# Patient Record
Sex: MALE | Race: WHITE | NOT HISPANIC OR LATINO | URBAN - METROPOLITAN AREA
[De-identification: names, ages, dates, MRNs, and addresses within clinical notes are randomized per-mention and may not be internally consistent; named-entity substitution may affect disease eponyms.]

---

## 2017-04-24 ENCOUNTER — COMMUNICATION - RIVER FALLS (OUTPATIENT)
Dept: FAMILY MEDICINE | Facility: CLINIC | Age: 42
End: 2017-04-24

## 2017-04-24 ENCOUNTER — OFFICE VISIT - RIVER FALLS (OUTPATIENT)
Dept: FAMILY MEDICINE | Facility: CLINIC | Age: 42
End: 2017-04-24

## 2017-04-24 ASSESSMENT — MIFFLIN-ST. JEOR: SCORE: 1939.38

## 2017-04-25 LAB
CHOLEST SERPL-MCNC: 249 MG/DL (ref 125–200)
CHOLEST/HDLC SERPL: 6.1 {RATIO}
CREAT SERPL-MCNC: 1.26 MG/DL (ref 0.6–1.35)
GLUCOSE BLD-MCNC: 79 MG/DL (ref 65–99)
HDLC SERPL-MCNC: 41 MG/DL
LDLC SERPL CALC-MCNC: 157 MG/DL
NONHDLC SERPL-MCNC: 208 MG/DL
TRIGL SERPL-MCNC: 255 MG/DL

## 2017-11-13 ENCOUNTER — OFFICE VISIT - RIVER FALLS (OUTPATIENT)
Dept: FAMILY MEDICINE | Facility: CLINIC | Age: 42
End: 2017-11-13

## 2017-11-13 ASSESSMENT — MIFFLIN-ST. JEOR: SCORE: 1934.84

## 2018-06-05 ENCOUNTER — OFFICE VISIT - RIVER FALLS (OUTPATIENT)
Dept: FAMILY MEDICINE | Facility: CLINIC | Age: 43
End: 2018-06-05

## 2018-06-05 ASSESSMENT — MIFFLIN-ST. JEOR: SCORE: 1954.8

## 2019-06-11 ENCOUNTER — COMMUNICATION - RIVER FALLS (OUTPATIENT)
Dept: FAMILY MEDICINE | Facility: CLINIC | Age: 44
End: 2019-06-11

## 2019-06-12 ENCOUNTER — OFFICE VISIT - RIVER FALLS (OUTPATIENT)
Dept: FAMILY MEDICINE | Facility: CLINIC | Age: 44
End: 2019-06-12

## 2019-06-12 ASSESSMENT — MIFFLIN-ST. JEOR: SCORE: 1971.13

## 2021-11-04 ENCOUNTER — OFFICE VISIT - RIVER FALLS (OUTPATIENT)
Dept: FAMILY MEDICINE | Facility: CLINIC | Age: 46
End: 2021-11-04

## 2021-11-04 ENCOUNTER — COMMUNICATION - RIVER FALLS (OUTPATIENT)
Dept: FAMILY MEDICINE | Facility: CLINIC | Age: 46
End: 2021-11-04

## 2021-11-05 ENCOUNTER — COMMUNICATION - RIVER FALLS (OUTPATIENT)
Dept: FAMILY MEDICINE | Facility: CLINIC | Age: 46
End: 2021-11-05

## 2022-02-12 VITALS
SYSTOLIC BLOOD PRESSURE: 124 MMHG | DIASTOLIC BLOOD PRESSURE: 82 MMHG | WEIGHT: 232.4 LBS | BODY MASS INDEX: 33.27 KG/M2 | HEIGHT: 70 IN | HEART RATE: 112 BPM

## 2022-02-12 VITALS
TEMPERATURE: 98 F | DIASTOLIC BLOOD PRESSURE: 78 MMHG | WEIGHT: 235.8 LBS | HEART RATE: 102 BPM | SYSTOLIC BLOOD PRESSURE: 126 MMHG | HEIGHT: 70 IN | BODY MASS INDEX: 33.76 KG/M2

## 2022-02-12 VITALS
SYSTOLIC BLOOD PRESSURE: 130 MMHG | OXYGEN SATURATION: 97 % | HEART RATE: 86 BPM | DIASTOLIC BLOOD PRESSURE: 78 MMHG | HEIGHT: 70 IN | BODY MASS INDEX: 34.27 KG/M2 | WEIGHT: 239.4 LBS

## 2022-02-12 VITALS
WEIGHT: 231.4 LBS | BODY MASS INDEX: 33.13 KG/M2 | SYSTOLIC BLOOD PRESSURE: 104 MMHG | OXYGEN SATURATION: 98 % | HEART RATE: 108 BPM | HEIGHT: 70 IN | DIASTOLIC BLOOD PRESSURE: 76 MMHG

## 2022-02-16 NOTE — LETTER
(Inserted Image. Unable to display)         July 21, 2020      PANCHO SANTAMARIA   [place], [place] [id number]        Dear PANCHO,    You are due for medication check with Dr Gibson.  Video/Telephone visits along with in person visits at the [facility] are available at this time.   Dr Gibson will refill your medication at that appointment.      Make sure that you schedule your appointment before you run out of medication to ensure you're not going without any medication.  No further refills will be given until you have an appointment.    You may call  [phone] to schedule your  visit with Dr Gibson

## 2022-02-16 NOTE — PROGRESS NOTES
Patient:   PANCHO SANTAMARIA            MRN: [id number]            FIN: [id number]               Age:   43 years     Sex:  Male     :  1975   Associated Diagnoses:   JOHN (generalized anxiety disorder)   Author:   Jimmy Gibson MD      Impression and Plan   Diagnosis     JOHN (generalized anxiety disorder) ([id number] F41.1).     Course:  Progressing as expected.    Orders     Orders   Charges (Evaluation and Management):  60457 office outpatient visit 15 minutes (Charge) (Order): Quantity: 1, Depression, major, single episode, complete remission  JOHN (generalized anxiety disorder).     Orders (Selected)   Prescriptions  Prescribed  sertraline 100 mg oral tablet: = 1 tab(s) ( 100 mg ), PO, Daily, # 90 tab(s), 3 Refill(s), Type: Maintenance, Pharmacy: [id number], 1 tab(s) Oral daily.        Visit Information      Date of Service: 2019 03:28 pm  Performing Location: [facility]  Encounter#: [id number]      Primary Care Provider (PCP):  ASHLYN -UNKNOWN,    NPI# [id number]   Visit type:  Scheduled follow-up.    Accompanied by:  No one.    Source of history:  Self.    Referral source:  Self.    History limitation:  None.       Chief Complaint   2019 3:31 PM CDT    Pt here for med ck        History of Present Illness             The patient presents with anxiety.  The anxiety is characterized by difficulty concentrating, irritability, nervousness and restlessness.  The severity of the anxiety is moderate.  The anxiety is constant.  The context of the anxiety: occurred in association with the inability to cope with stress.  Exacerbating factors consist of emotional stress and large crowds of people.  Relieving factors consist of medication and stress reduction.        Review of Systems   Constitutional:  Negative.    Eye:  Negative.    Ear/Nose/Mouth/Throat:  Negative.    Respiratory:  Negative.    Cardiovascular:  Negative.    Gastrointestinal:  Negative.    Genitourinary:  Negative.     Hematology/Lymphatics:  Negative.    Endocrine:  Negative.    Immunologic:  Negative.    Musculoskeletal:  Negative.    Integumentary:  Negative.    Neurologic:  Negative.    Psychiatric:  Anxiety.    All other systems reviewed and negative      Health Status   Allergies:    Allergic Reactions (Selected)  No Known Medication Allergies   Medications:  (Selected)   Prescriptions  Prescribed  sertraline 100 mg oral tablet: = 1 tab(s) ( 100 mg ), PO, Daily, # 90 tab(s), 3 Refill(s), Type: Maintenance, Pharmacy: [id number], 1 tab(s) Oral daily   Problem list:    All Problems  Depression, major, single episode, complete remission / SNOMED CT 65325553 / Confirmed  JOHN (generalized anxiety disorder) / SNOMED CT 26249022 / Confirmed  Kidney stone / SNOMED CT 122683958 / Confirmed  Obesity / SNOMED CT 7770890323 / Probable      Histories   Past Medical History:    No active or resolved past medical history items have been selected or recorded.   Family History:       Procedure history:    No active procedure history items have been selected or recorded.      Physical Examination   Vital Signs   6/12/2019 3:31 PM CDT Peripheral Pulse Rate 86 bpm    Systolic Blood Pressure 130 mmHg    Diastolic Blood Pressure 78 mmHg    Mean Arterial Pressure 95 mmHg    Oxygen Saturation 97 %      Measurements from flowsheet : Measurements   6/12/2019 3:31 PM CDT Height Measured - Standard 70.25 in    Weight Measured - Standard 239.4 lb    BSA 2.32 m2    Body Mass Index 34.1 kg/m2  HI      PHQ 9 score: 6  JOHN 7 score: 9   General:  Alert and oriented X 3, No acute distress, Warm, Pink, Intact.         Appearance: Within normal limits, Well nourished, Calm.         Hydration: Within normal limits.         Psych: Within normal limits, Appropriate mood and affect, Cooperative, Normal judgment.    Psychiatric:  Cooperative, Non-suicidal.         Mood and affect: Calm.         Behavior: Relaxed.         Judgment: Able to make sensible  decisions, Appropriate in social situations.         Thought process: Appropriate.

## 2022-02-16 NOTE — PROGRESS NOTES
Patient:   PANCHO SANTAMARIA            MRN: [id number]            FIN: [id number]               Age:   42 years     Sex:  Male     :  1975   Associated Diagnoses:   JOHN (generalized anxiety disorder)   Author:   Jimmy Gibson MD      Impression and Plan   Diagnosis     JOHN (generalized anxiety disorder) ([id number] F41.1).     Course:  Worsening.    Plan:  Increase Sertraline to 100 mg daily  consider small Rx Benzo for panic attacks.    Orders     Orders   Charges (Evaluation and Management):  60305 office outpatient visit 15 minutes (Charge) (Order): Quantity: 1, JOHN (generalized anxiety disorder).       Orders (Selected)   Prescriptions  Prescribed  sertraline 100 mg oral tablet: 1 tab(s) ( 100 mg ), PO, Daily, # 30 tab(s), 5 Refill(s), Type: Maintenance, Pharmacy: [id number], 1 tab(s) po daily.          Visit Information      Date of Service: 2017 08:54 am  Performing Location: [facility]  Encounter#: [id number]      Primary Care Provider (PCP):  ASHLYN -UNKNOWN,   Visit type:  Scheduled follow-up.    Accompanied by:  No one.    Source of history:  Self.    Referral source:  Self.    History limitation:  None.       Chief Complaint   2017 9:00 AM CST   Pt here for med ck      History of Present Illness             The patient presents with anxiety.  The anxiety is characterized by difficulty concentrating, irritability, nervousness and restlessness.  The severity of the anxiety is moderate.  The anxiety is constant.  The context of the anxiety: occurred in association with the inability to cope with stress.  Exacerbating factors consist of emotional stress and large crowds of people.  Relieving factors consist of medication and stress reduction.        Review of Systems   Constitutional:  Negative.    Eye:  Negative.    Ear/Nose/Mouth/Throat:  Negative.    Respiratory:  Negative.    Cardiovascular:  Negative.    Gastrointestinal:  Negative.    Genitourinary:  Negative.     Hematology/Lymphatics:  Negative.    Endocrine:  Negative.    Immunologic:  Negative.    Musculoskeletal:  Negative.    Integumentary:  Negative.    Neurologic:  Negative.    Psychiatric:  Anxiety.    All other systems reviewed and negative      Health Status   Allergies:    Allergic Reactions (Selected)  No Known Medication Allergies   Medications:  (Selected)   Prescriptions  Prescribed  sertraline 50 mg oral tablet: 1 tab(s) ( 50 mg ), po, daily, # 30 tab(s), 0 Refill(s), Type: Maintenance, Pharmacy: [id number], 1 tab(s) po daily   Problem list:    All Problems  Depression, major, single episode, complete remission / SNOMED CT 35966817 / Confirmed  JOHN (generalized anxiety disorder) / SNOMED CT 37939177 / Confirmed  Kidney stone / SNOMED CT 797476638 / Confirmed  Obesity / SNOMED CT 7657420524 / Probable      Histories   Past Medical History:    No active or resolved past medical history items have been selected or recorded.   Family History:       Procedure history:    No active procedure history items have been selected or recorded.      Physical Examination   Vital Signs   11/13/2017 9:00 AM CST Peripheral Pulse Rate 108 bpm  HI    Systolic Blood Pressure 104 mmHg    Diastolic Blood Pressure 76 mmHg    Mean Arterial Pressure 85 mmHg    BP Site Right arm    Oxygen Saturation 98 %      Measurements from flowsheet : Measurements   11/13/2017 9:00 AM CST Height Measured - Standard 70.25 in    Weight Measured - Standard 231.4 lb    BSA 2.28 m2    Body Mass Index 32.96 kg/m2      PHQ 9 score: 4  JOHN 7 score: 8   General:  Alert and oriented X 3, No acute distress, Warm, Pink, Intact.         Appearance: Within normal limits, Well nourished, Calm.         Hydration: Within normal limits.         Psych: Within normal limits, Appropriate mood and affect, Cooperative, Normal judgment.    Psychiatric:  Cooperative, Non-suicidal.         Mood and affect: Calm.         Behavior: Relaxed.         Judgment:  Able to make sensible decisions, Appropriate in social situations.         Thought process: Appropriate.

## 2022-02-16 NOTE — NURSING NOTE
Comprehensive Intake Entered On:  6/12/2019 3:34 PM CDT    Performed On:  6/12/2019 3:31 PM CDT by Carly Webber CMA               Summary   Chief Complaint :   Pt here for med ck   Weight Measured :   239.4 lb(Converted to: 239 lb 6 oz, 108.59 kg)    Height Measured :   70.25 in(Converted to: 5 ft 10 in, 178.43 cm)    Body Mass Index :   34.1 kg/m2 (HI)    Body Surface Area :   2.32 m2   Systolic Blood Pressure :   130 mmHg   Diastolic Blood Pressure :   78 mmHg   Mean Arterial Pressure :   95 mmHg   Peripheral Pulse Rate :   86 bpm   Oxygen Saturation :   97 %   Carly Webber CMA - 6/12/2019 3:31 PM CDT   Health Status   Allergies Verified? :   Yes   Medication History Verified? :   Yes   Medical History Verified? :   Yes   Pre-Visit Planning Status :   Completed   Tobacco Use? :   Never smoker   Carly Webber CMA - 6/12/2019 3:31 PM CDT   Consents   Consent for Immunization Exchange :   Consent Granted   Consent for Immunizations to Providers :   Consent Granted   Carly Webber CMA - 6/12/2019 3:31 PM CDT   Meds / Allergies   (As Of: 6/12/2019 3:34:22 PM CDT)   Allergies (Active)   No Known Medication Allergies  Estimated Onset Date:   Unspecified ; Created By:   Carly Johnson; Reaction Status:   Active ; Category:   Drug ; Substance:   No Known Medication Allergies ; Type:   Allergy ; Updated By:   Carly Johnson; Reviewed Date:   6/12/2019 3:33 PM CDT        Medication List   (As Of: 6/12/2019 3:34:22 PM CDT)   Prescription/Discharge Order    sertraline  :   sertraline ; Status:   Prescribed ; Ordered As Mnemonic:   sertraline 100 mg oral tablet ; Simple Display Line:   100 mg, 1 tab(s), PO, Daily, 90 tab(s), 3 Refill(s) ; Ordering Provider:   Jimmy Gibson MD; Catalog Code:   sertraline ; Order Dt/Tm:   6/5/2018 3:49:28 PM

## 2022-02-16 NOTE — TELEPHONE ENCOUNTER
---------------------  From: Concha Coronado CMA (Phone Messages Pool ([id number]))   To: [facility] Message Pool ([id number]);     Sent: 11/4/2021 4:51:23 PM CDT  Subject: 02 Readings     Phone Message:      PCP: HARPREET    Person Calling: Pt and wife  Phone: [phone]  Time: 4:50pm    Reason for call: Pt states that Kristina advised pt to get a home 02 sensor to read at home. They called back stating pt has not dropped below 90%. Has been sitting at the 93% range. Please call pt back if Kristina wants to address.---------------------  From: Brandy ESPINAL Kim ([facility] Message Pool ([id number]_[facility]))   To: Kristina Nino PA-C;     Sent: 11/4/2021 5:45:43 PM CDT  Subject: FW: 02 Readings---------------------  From: Kristina Nino PA-C   To: Phone Messages Pool ([id number]); Carly Webber CMA;     Sent: 11/4/2021 9:43:00 PM CDT  Subject: RE: 02 Readings     Thanks.      Pt was quite ill on phone visit, on day 9 of Covid 19 infection.     Previous Arthur pt, no new pcp.  I am including abram because I think he would appreciate a familiar person if Abram is available in clinic to call.        I would really appreciate if someone could touch base with him on Friday going into the weekend. See if sats are greater than 90 %. and that he is maintaining hydration.      IF not he should be in the ED.      Continue to encourage deep breathing, hydrating well, moving around to expand lungs.    If he has any concerns over the weekend I will be in clinic 8-12 each day he can walk in with a mask and let them know he has known covid 19 so we can room him immediately,  or he can go the ER for any severe sx.    I have given him tessalon for cough and albuterol for any wheezing, sob.LM for a return call at [phone] sent via portal

## 2022-02-16 NOTE — NURSING NOTE
Comprehensive Intake Entered On:  11/4/2021 3:17 PM CDT    Performed On:  11/4/2021 3:14 PM CDT by Kim Nava CMA               Summary   Chief Complaint :   Tested + for Covid 11/2 - sx started 10/27.  Continues to be bothered with cough - persistent cough with any movement, talking.  feels like it has gotten worse in the last 3d.  Has tried Robitussin chest/alvarado, Tylenol  - verbal consent for video visit   Kim Nava CMA - 11/4/2021 3:14 PM CDT   Health Status   Allergies Verified? :   Yes   Medication History Verified? :   Yes   Medical History Verified? :   Yes   Pre-Visit Planning Status :   Completed   Tobacco Use? :   Never smoker   Kim Nava CMA - 11/4/2021 3:14 PM CDT   Social History   Social History   (As Of: 11/4/2021 3:17:20 PM CDT)   Alcohol:  Denies Alcohol Use      Past   (Last Updated: 11/13/2017 10:35:51 AM CST by Carly Webber CMA)          Tobacco:        Never   (Last Updated: 9/14/2015 3:52:33 PM CDT by Carly Webber CMA)   Never (less than 100 in lifetime)   (Last Updated: 11/4/2021 3:17:08 PM CDT by Kim Nava CMA)          Electronic Cigarette/Vaping:        Electronic Cigarette Use: Never.   (Last Updated: 11/4/2021 3:17:03 PM CDT by Kim Nava CMA)          Substance Abuse:        Never   (Last Updated: 9/14/2015 3:52:33 PM CDT by Carly Webber CMA)          Employment/School:        Employed, Work/School description: .   (Last Updated: 9/14/2015 3:52:33 PM CDT by Carly Webber CMA)          Home/Environment:        Marital status: .  Spouse/Partner name: Callie.  Lives with Self, Children, Spouse.  1 children.   (Last Updated: 9/14/2015 3:52:33 PM CDT by Carly Webber CMA)

## 2022-02-16 NOTE — NURSING NOTE
Depression Screening Entered On:  6/12/2019 4:33 PM CDT    Performed On:  6/12/2019 4:33 PM CDT by Carly Webber CMA               Depression Screening   Little Interest - Pleasure in Activities :   Not at all   Feeling Down, Depressed, Hopeless :   Not at all   Initial Depression Screen Score :   0    Trouble Falling or Staying Asleep :   More than half the days   Feeling Tired or Little Energy :   Several days   Poor Appetite or Overeating :   Several days   Feeling Bad About Yourself :   Not at all   Trouble Concentrating :   More than half the days   Moving or Speaking Slowly :   Not at all   Thoughts Better Off Dead or Hurting Self :   Not at all   Detailed Depression Screen Score :   6    Total Depression Screen Score :   6    JOHN Difficulty with Work, Home, Others :   Somewhat difficult   Carly Webber CMA - 6/12/2019 4:33 PM CDT

## 2022-02-16 NOTE — NURSING NOTE
CAGE Assessment Entered On:  6/12/2019 4:33 PM CDT    Performed On:  6/12/2019 4:33 PM CDT by Carly Webber CMA               Assessment   Have you ever felt you should cut down on your drinking :   No   Have people annoyed you by criticizing your drinking :   No   Have you ever felt bad or guilty about your drinking :   No   Have you ever taken a drink first thing in the morning to steady your nerves or get rid of a hangover (Eye-opener) :   No   CAGE Score :   0    Carly Webber CMA - 6/12/2019 4:33 PM CDT

## 2022-02-16 NOTE — NURSING NOTE
Generalized Anxiety Disorder Screening Entered On:  6/12/2019 4:34 PM CDT    Performed On:  6/12/2019 4:33 PM CDT by Carly Webber CMA               Generalized Anxiety Disorder Screening   JOHN Nervous, Anxious On Edge :   More than half the days   JOHN Control Worrying B :   Several days   JOHN Worrying Too Much :   Several days   JOHN Restless :   Several days   JOHN Easily Annoyed/Irritable :   More than half the days   JOHN Afraid :   Not at all   JOHN Trouble Relaxing :   More than half the days   JOHN Total Screening Score :   9    JOHN Difficulty with Work, Home, Others :   Somewhat difficult   Carly Webber CMA - 6/12/2019 4:33 PM CDT

## 2022-02-16 NOTE — TELEPHONE ENCOUNTER
---------------------  From: Kim Nava CMA ([facility] Message Pool ([id number]_[facility]))   To: PANCHO SANTAMARIA    Sent: 11/5/2021 9:43:01 AM CDT  Subject: RE: Message for Dr Trung Anthony,    Thank you  for the update.    This is what Va wanted to share with you.    See if  O2 sats are greater than 90 %. and that he is maintaining hydration.      IF not he should be in the ED.       Continue to encourage deep breathing, hydrating well, moving around to expand lungs.    If he has any concerns over the weekend I will be in clinic 8-12 each day he can walk in with a mask and let them know he has known covid 19 so we can room him immediately,  or he can go the ER for any severe symptoms.    I have given him tessalon for cough and albuterol for any wheezing, shortness of breath.    Please let us know if you have any questions, concerns.    Kim        ---------------------  From: PANCHO SANTAMARIA  To: [facility]  Sent: 11/05/2021 09:31 a.m. CDT  Subject: Message for Dr Nino  I slept well, close to 10 hrs.  Cough hasn t changed.  Oxygen was at 96 this morning.

## 2022-02-16 NOTE — TELEPHONE ENCOUNTER
---------------------  From: Traci Jara (Appointment Pool ([id number]_[facility]))   To: PANCHO SANTAMARIA    Sent: 2019 1:34:34 PM CDT  Subject: RE: Appointment Request     Appointment is set for  at 3:40p.m.  Please let us know if this does not work.  Thank you.        ---------------------  From: PANCHO SANTAMARIA  To: [facility]  Sent: 2019 11:55 a.m. CDT  Subject: Appointment Request  Patient Name: KENRICKAVEL TIRADOBINS  Patient : 1975  Appointment Dates: Between 2019 and 2019  Preferred Days: Wednesday,Thursday  Preferred Time: 3:30pm  Contact Patient by: Secure Message    Reason for Visit:    Med check and prescription refill

## 2022-02-16 NOTE — PROGRESS NOTES
Chief Complaint    Tested + for Covid 11/2 - sx started 10/27.  Continues to be bothered with cough - persistent cough with any movement, talking.  feels like it has gotten worse in the last 3d.  Has tried Robitussin chest/alvarado, Tylenol  - verbal consent for video visit  History of Present Illness      Today's visit was conducted via video due to the COVID-19 pandemic. PT consent to video visit was obtained and documented       Call Start Time:  1545      Call End Time:    1600      Provider location: clinic office      Pt location:  home in [place], wife present.       Pt reports he was diagnosed with covid 19 on 11-2-21 with onset of sx 9 days ago on 10-27-21.  He has had worsening cough, congestion in the chest, sob with activity.  no chest pain.  no sob at rest.  Coughing is quite persistent and he is requesting some relief for his cough.  does feel wheezy at times.       Not certain of O2 sat.  no hx of asthma, allergies, COPD or tobacco use.  Pt is otherwise healthy without chronic medical problems other than depression well controlled.  Pt is taking fluids well.  no dizzy or lightheaded.          Review of Systems      Review of systems is negative with the exception of those noted in HPI   Physical Exam      nad appears well, sitting up right, able to talk in full sentances but frequent congested sounding cough.   Assessment/Plan       1. COVID-19 (U07.1)         pt is on day 9 of covid 19 and sigificant cough, worsening sx, fatigue.  Will try albuterol and tessalon.  He has mild sob.  He will get O2 sat at the pharmacy in [place], and call with level.  Discussed deep breathing, moving around to expand lungs.  if O2 sat 90% or less consistently should go to the ED.  I discussed I am happy to see him the clinic, but typically would not change treatment plan at this point as long as sats are above 90 and not having severe sx, well hydrated.         Pt agreeable with plan.  Will need to  be monitored closely.         Orders:         albuterol, 2 puff(s), Inhale, q4 hrs, PRN: shortness of breath or wheezing, # 18 gm, 1 Refill(s), Type: Maintenance, Pharmacy: [facility], 2 puff(s) Inhale q4 hrs,PRN:shortness of breath or wheezing, (Ordered)         benzonatate, = 2 cap(s) ( 200 mg ), Oral, tid, x 7 day(s), # 42 cap(s), 0 Refill(s), Type: Acute, Pharmacy: [facility], 2 cap(s) Oral tid,x7 day(s), (Ordered)  Patient Information     Name:PANCHO SANTAMARIA      Address:      [address] [id number]     Sex:Male     YOB: 1975     [phone]     Emergency Contact:[facility], CONTACT     MRN:[id number]     FIN:[id number]     Location:[facility]     Date of Service:11/04/2021      Primary Care Physician:       Jimmy Gibson MD, [phone]      Attending Physician:       Kristina Nino PA-C, [phone]  Problem List/Past Medical History    Ongoing     Depression, major, single episode, complete remission     JOHN (generalized anxiety disorder)     Kidney stone     Obesity    Historical     No qualifying data  Medications    Albuterol (Eqv-ProAir HFA) 90 mcg/inh inhalation aerosol, 2 puff(s), Inhale, q4 hrs, PRN, 1 refills    sertraline 100 mg oral tablet, 100 mg= 1 tab(s), Oral, daily, 3 refills    Tessalon Perles 100 mg oral capsule, 200 mg= 2 cap(s), Oral, tid  Allergies    No Known Medication Allergies  Social History    Smoking Status     Never smoker     Alcohol - Denies Alcohol Use      Past     Electronic Cigarette/Vaping      Electronic Cigarette Use: Never.     Employment/School      Employed, Work/School description: .     Home/Environment      Marital status: . Spouse/Partner name: Callie. Lives with Self, Children, Spouse. 1 children.     Substance Abuse      Never     Tobacco      Never (less than 100 in lifetime)  Family History    Father: History is negative    Sister: History is negative  Immunizations        Scheduled Immunizations       Dose Date(s)       influenza virus vaccine, inactivated       10/28/2020       SARS-CoV-2 (COVID-19) [facility] Ad26 vacc       03/30/2021       tetanus/diphth/pertuss (Tdap) adult/adol       10/27/2015       Other Immunizations               influenza virus vaccine, inactivated       10/27/2015       tetanus/diphth/pertuss (Tdap) adult/adol       10/27/2015       influenza, H1N1, live       01/25/2010

## 2022-02-16 NOTE — PROGRESS NOTES
Patient:   PANCHO SANTAMARIA            MRN: [id number]            FIN: [id number]               Age:   42 years     Sex:  Male     :  1975   Associated Diagnoses:   JOHN (generalized anxiety disorder)   Author:   Jimmy Gibson MD      Impression and Plan   Diagnosis     JOHN (generalized anxiety disorder) ([id number] F41.1).     Course:  Progressing as expected.    Orders     Orders   Charges (Evaluation and Management):  03170 office outpatient visit 15 minutes (Charge) (Order): Quantity: 1, JOHN (generalized anxiety disorder).     Orders (Selected)   Prescriptions  Prescribed  sertraline 100 mg oral tablet: 1 tab(s) ( 100 mg ), PO, Daily, # 90 tab(s), 3 Refill(s), Type: Maintenance, Pharmacy: [id number], 1 tab(s) po daily.        Visit Information      Date of Service: 2018 03:16 pm  Performing Location: [facility]  Encounter#: [id number]      Primary Care Provider (PCP):  AS[facility] -UNKNOWN,   Visit type:  Scheduled follow-up.    Accompanied by:  No one.    Source of history:  Self.    Referral source:  Self.    History limitation:  None.       Chief Complaint   2018 3:25 PM CDT     Pt her for med check- Sertraline        History of Present Illness             The patient presents with anxiety.  The anxiety is characterized by difficulty concentrating, irritability, nervousness and restlessness.  The severity of the anxiety is moderate.  The anxiety is constant.  The context of the anxiety: occurred in association with the inability to cope with stress.  Exacerbating factors consist of emotional stress and large crowds of people.  Relieving factors consist of medication and stress reduction.        Review of Systems   Constitutional:  Negative.    Eye:  Negative.    Ear/Nose/Mouth/Throat:  Negative.    Respiratory:  Negative.    Cardiovascular:  Negative.    Gastrointestinal:  Negative.    Genitourinary:  Negative.    Hematology/Lymphatics:  Negative.    Endocrine:  Negative.     Immunologic:  Negative.    Musculoskeletal:  Negative.    Integumentary:  Negative.    Neurologic:  Negative.    Psychiatric:  Anxiety.    All other systems reviewed and negative      Health Status   Allergies:    Allergic Reactions (Selected)  No Known Medication Allergies   Medications:  (Selected)   Prescriptions  Prescribed  sertraline 100 mg oral tablet: 1 tab(s) ( 100 mg ), PO, Daily, # 90 tab(s), 3 Refill(s), Type: Maintenance, Pharmacy: [id number], 1 tab(s) po daily   Problem list:    All Problems  Depression, major, single episode, complete remission / SNOMED CT 54213708 / Confirmed  JONH (generalized anxiety disorder) / SNOMED CT 05005690 / Confirmed  Kidney stone / SNOMED CT 271024886 / Confirmed  Obesity / SNOMED CT 9811315831 / Probable      Histories   Past Medical History:    No active or resolved past medical history items have been selected or recorded.   Family History:       Procedure history:    No active procedure history items have been selected or recorded.      Physical Examination   Vital Signs   6/5/2018 3:25 PM CDT Temperature Tympanic 98.0 DegF    Peripheral Pulse Rate 102 bpm  HI    Pulse Site Radial artery    HR Method Electronic    Systolic Blood Pressure 126 mmHg    Diastolic Blood Pressure 78 mmHg    Mean Arterial Pressure 94 mmHg    BP Site Right arm    BP Method Manual      Measurements from flowsheet : Measurements   6/5/2018 3:25 PM CDT Height Measured - Standard 70.25 in    Weight Measured - Standard 235.8 lb    BSA 2.3 m2    Body Mass Index 33.59 kg/m2  HI      PHQ 9 score: 4  JOHN 7 score: 9   General:  Alert and oriented X 3, No acute distress, Warm, Pink, Intact.         Appearance: Within normal limits, Well nourished, Calm.         Hydration: Within normal limits.         Psych: Within normal limits, Appropriate mood and affect, Cooperative, Normal judgment.    Psychiatric:  Cooperative, Non-suicidal.         Mood and affect: Calm.         Behavior: Relaxed.          Judgment: Able to make sensible decisions, Appropriate in social situations.         Thought process: Appropriate.

## 2022-02-16 NOTE — PROGRESS NOTES
Patient:   PANCHO SANTAMARIA            MRN: [id number]            FIN: [id number]               Age:   41 years     Sex:  Male     :  1975   Associated Diagnoses:   Well adult exam; JOHN (generalized anxiety disorder)   Author:   Jimmy Gibson MD      Impression and Plan   Diagnosis     Well adult exam ([id number] Z00.00).     Orders     Orders   Charges (Evaluation and Management):  00423 periodic preventive med est patient 40-64yrs (Charge) (Order): Quantity: 1, Well adult exam.     Orders (Selected)   Outpatient Orders  Ordered  CBC w/o Diff (Request): Obesity  Comprehensive Metabolic Panel (Request): Obesity  Lipid Panel and Chol/HDL Ratio w/ Reflex to Direct LDL (Request): Obesity.     Diagnosis     JOHN (generalized anxiety disorder) ([id number] F41.1).     Course:  Worsening.    Orders     Orders (Selected)   Prescriptions  Prescribed  sertraline 50 mg oral tablet: 1 tab(s) ( 50 mg ), po, daily, # 90 tab(s), 1 Refill(s), Type: Maintenance.        Visit Information      Date of Service: 2017 03:55 pm  Performing Location: [facility]  Encounter#: [id number]      Primary Care Provider (PCP):  [facility] -UNKNOWN,      Referring Provider:  No referring provider recorded for selected visit.   Visit type:  Annual exam.    Accompanied by:  No one.    Source of history:  Self.    History limitation:  None.       Chief Complaint   Chief complaint discussed and confirmed correct.     2017 3:57 PM CDT    Pt here for px        Well Adult History   Well Adult History             The patient presents for well adult exam.  The patient's general health status is described as good.  The patient's diet is described as balanced.  Exercise: routine.  Associated symptoms consist of none.  Medical encounters: none.  Compliance problems: none.        Review of Systems   Constitutional:  Negative, weight gain.    Eye:  Negative.    Ear/Nose/Mouth/Throat:  Negative.    Respiratory:  Negative.    Cardiovascular:   Negative.    Gastrointestinal:  Negative.    Genitourinary:  Negative.    Hematology/Lymphatics:  Negative.    Endocrine:  Negative.    Immunologic:  Negative.    Musculoskeletal:  Joint pain.    Integumentary:  Negative.    Neurologic:  Negative.    Psychiatric:  Anxiety.    All other systems reviewed and negative      Health Status   Allergies:    Allergic Reactions (Selected)  No Known Medication Allergies   Medications:  (Selected)   Prescriptions  Prescribed  sertraline 50 mg oral tablet: 1 tab(s) ( 50 mg ), po, daily, # 90 tab(s), 1 Refill(s), Type: Maintenance   Problem list:    All Problems  Depression, major, single episode, complete remission / SNOMED CT 94901033 / Confirmed  Kidney stone / SNOMED CT 866935316 / Confirmed  Obesity / SNOMED CT 1416544579 / Probable      Histories   Past Medical History:    No active or resolved past medical history items have been selected or recorded.   Family History:       Procedure history:    No active procedure history items have been selected or recorded.   Social History:        Alcohol Assessment            Current      Tobacco Assessment            Never      Substance Abuse Assessment            Never      Employment and Education Assessment            Employed, Work/School description: .      Home and Environment Assessment            Marital status: .  Spouse/Partner name: Callie.  Lives with Self, Children, Spouse.  1 children.        Physical Examination   Vital signs reviewed  and within acceptable limits    Vital Signs   4/24/2017 3:57 PM CDT Peripheral Pulse Rate 112 bpm  HI (Modified)    Pulse Site Radial artery     HR Method Manual     Systolic Blood Pressure 124 mmHg     Diastolic Blood Pressure 82 mmHg     Mean Arterial Pressure 96 mmHg     BP Site Right arm     BP Method Electronic       Measurements from flowsheet : Measurements   4/24/2017 3:57 PM CDT Height Measured - Standard 70.25 in    Weight Measured - Standard 232.4 lb    BSA  2.28 m2    Body Mass Index 33.11 kg/m2      General:  Alert and oriented, No acute distress.    Eye:  Pupils are equal, round and reactive to light, Extraocular movements are intact, Normal conjunctiva.    HENT:  Normocephalic, Tympanic membranes are clear, Normal hearing, Oral mucosa is moist, No pharyngeal erythema.    Neck:  Supple, Non-tender, No carotid bruit, No jugular venous distention, No lymphadenopathy, No thyromegaly.    Respiratory:  Lungs are clear to auscultation, Respirations are non-labored, Breath sounds are equal, Symmetrical chest wall expansion, No chest wall tenderness.    Cardiovascular:  Normal rate, Regular rhythm, No murmur, No gallop, Good pulses equal in all extremities, Normal peripheral perfusion, No edema.    Gastrointestinal:  Soft, Non-tender, Non-distended, Normal bowel sounds, No organomegaly.    Genitourinary:  No scrotal tenderness, No inguinal tenderness, No urethral discharge, No lesions.    Lymphatics:  No lymphadenopathy neck, axilla, groin.    Musculoskeletal:  Normal range of motion, Normal strength, No tenderness, No swelling, No deformity, Normal gait.    Integumentary:  Warm, Dry, Pink.    Neurologic:  Normal sensory, Normal motor function, No focal deficits, Cranial Nerves II-XII are grossly intact, Normal deep tendon reflexes.    Psychiatric:  Cooperative, Appropriate mood & affect, Normal judgment.

## 2022-05-29 ENCOUNTER — HEALTH MAINTENANCE LETTER (OUTPATIENT)
Age: 47
End: 2022-05-29

## 2022-10-03 ENCOUNTER — HEALTH MAINTENANCE LETTER (OUTPATIENT)
Age: 47
End: 2022-10-03

## 2023-06-04 ENCOUNTER — HEALTH MAINTENANCE LETTER (OUTPATIENT)
Age: 48
End: 2023-06-04